# Patient Record
Sex: MALE | Race: WHITE | Employment: FULL TIME | URBAN - METROPOLITAN AREA
[De-identification: names, ages, dates, MRNs, and addresses within clinical notes are randomized per-mention and may not be internally consistent; named-entity substitution may affect disease eponyms.]

---

## 2017-09-22 RX ORDER — LISINOPRIL AND HYDROCHLOROTHIAZIDE 12.5; 1 MG/1; MG/1
TABLET ORAL
Qty: 30 TABLET | Refills: 0 | OUTPATIENT
Start: 2017-09-22 | End: 2017-10-23 | Stop reason: SDUPTHER

## 2017-10-23 RX ORDER — LISINOPRIL AND HYDROCHLOROTHIAZIDE 12.5; 1 MG/1; MG/1
TABLET ORAL
Qty: 15 TABLET | Refills: 0 | OUTPATIENT
Start: 2017-10-23 | End: 2017-11-06 | Stop reason: SDUPTHER

## 2017-11-06 RX ORDER — LISINOPRIL AND HYDROCHLOROTHIAZIDE 12.5; 1 MG/1; MG/1
TABLET ORAL
Qty: 15 TABLET | Refills: 0 | OUTPATIENT
Start: 2017-11-06 | End: 2017-11-07 | Stop reason: SDUPTHER

## 2017-11-07 ENCOUNTER — TELEPHONE (OUTPATIENT)
Dept: INTERNAL MEDICINE CLINIC | Age: 47
End: 2017-11-07

## 2017-11-07 RX ORDER — LISINOPRIL AND HYDROCHLOROTHIAZIDE 12.5; 1 MG/1; MG/1
TABLET ORAL
Qty: 30 TABLET | Refills: 1 | Status: SHIPPED | OUTPATIENT
Start: 2017-11-07 | End: 2017-11-22 | Stop reason: SDUPTHER

## 2017-11-24 RX ORDER — LISINOPRIL AND HYDROCHLOROTHIAZIDE 12.5; 1 MG/1; MG/1
TABLET ORAL
Qty: 30 TABLET | Refills: 0 | Status: SHIPPED | OUTPATIENT
Start: 2017-11-24 | End: 2018-02-19

## 2017-12-21 ENCOUNTER — OFFICE VISIT (OUTPATIENT)
Dept: INTERNAL MEDICINE CLINIC | Age: 47
End: 2017-12-21

## 2017-12-21 VITALS
BODY MASS INDEX: 25.57 KG/M2 | WEIGHT: 188.8 LBS | SYSTOLIC BLOOD PRESSURE: 144 MMHG | RESPIRATION RATE: 16 BRPM | HEIGHT: 72 IN | DIASTOLIC BLOOD PRESSURE: 100 MMHG | HEART RATE: 90 BPM

## 2017-12-21 DIAGNOSIS — I10 ESSENTIAL HYPERTENSION: Primary | ICD-10-CM

## 2017-12-21 DIAGNOSIS — M54.2 NECK PAIN ON LEFT SIDE: ICD-10-CM

## 2017-12-21 DIAGNOSIS — Z72.0 TOBACCO USE: ICD-10-CM

## 2017-12-21 DIAGNOSIS — E78.2 MIXED HYPERLIPIDEMIA: ICD-10-CM

## 2017-12-21 DIAGNOSIS — K21.9 GASTROESOPHAGEAL REFLUX DISEASE, ESOPHAGITIS PRESENCE NOT SPECIFIED: ICD-10-CM

## 2017-12-21 PROCEDURE — 99214 OFFICE O/P EST MOD 30 MIN: CPT | Performed by: INTERNAL MEDICINE

## 2017-12-21 ASSESSMENT — PATIENT HEALTH QUESTIONNAIRE - PHQ9
SUM OF ALL RESPONSES TO PHQ QUESTIONS 1-9: 0
1. LITTLE INTEREST OR PLEASURE IN DOING THINGS: 0
SUM OF ALL RESPONSES TO PHQ9 QUESTIONS 1 & 2: 0
2. FEELING DOWN, DEPRESSED OR HOPELESS: 0

## 2017-12-21 ASSESSMENT — ENCOUNTER SYMPTOMS
SHORTNESS OF BREATH: 0
WHEEZING: 0

## 2017-12-21 NOTE — PROGRESS NOTES
Subjective:      Patient ID: Bhumi Simmons is a 52 y.o. male. HPI  Here for an annual exam.  Brother recently with pancreatic cancer diagnosis. Has a shooting pain in left neck and arm with certain head movements, radiates to his left elbow. HTN-  Denies chest pain or shortness of breath. Denies PND or orthopnea. No lower extremity edema noted. GERD-  well controlled, no GERD when takes meds. Review of Systems   Respiratory: Negative for shortness of breath and wheezing. Cardiovascular: Negative. Negative for chest pain and palpitations. All other systems reviewed and are negative. Current Outpatient Prescriptions   Medication Sig Dispense Refill    lisinopril-hydrochlorothiazide (PRINZIDE;ZESTORETIC) 10-12.5 MG per tablet TAKE 1 TABLET BY MOUTH EVERY DAY 30 tablet 0    omeprazole (PRILOSEC) 20 MG capsule Take 20 mg by mouth daily. No current facility-administered medications for this visit. Allergies:  Review of patient's allergies indicates no known allergies.       Past Medical History:   Diagnosis Date    GERD (gastroesophageal reflux disease)     Hyperlipidemia     Hypertension          Past Surgical History:   Procedure Laterality Date    APPENDECTOMY  age 13    Ruptured    VASECTOMY           Social History   Substance Use Topics    Smoking status: Current Every Day Smoker     Packs/day: 0.50     Start date: 1/1/1985    Smokeless tobacco: Never Used    Alcohol use 10.8 oz/week     18 Cans of beer per week         Family History   Problem Relation Age of Onset    Alzheimer's Disease Mother     Cancer Brother 48     Pancreatic Cancer         Vitals:    12/21/17 1759 12/21/17 1801 12/21/17 1842   BP: (!) 140/70 (!) 140/70 (!) 144/100   Site: Left Arm     Position: Sitting     Cuff Size: Medium Adult     Pulse: 90     Resp: 16     Weight: 188 lb 12.8 oz (85.6 kg)     Height: 6' (1.829 m)          Objective:   Physical Exam   Constitutional: He is oriented to person, place, and time. He appears well-developed and well-nourished. HENT:   Head: Normocephalic and atraumatic. Right Ear: External ear normal.   Left Ear: External ear normal.   Nose: Nose normal.   Mouth/Throat: Oropharynx is clear and moist. No oropharyngeal exudate. Neck: Normal range of motion. Neck supple. No thyromegaly present. Cardiovascular: Normal rate, regular rhythm and normal heart sounds. Pulmonary/Chest: Effort normal and breath sounds normal. He has no wheezes. He has no rales. Abdominal: Soft. Bowel sounds are normal. There is no tenderness. There is no rebound. Musculoskeletal: Normal range of motion. He exhibits no edema. Lymphadenopathy:     He has no cervical adenopathy. Neurological: He is oriented to person, place, and time. Reflex Scores:       Tricep reflexes are 2+ on the right side and 0 on the left side. Bicep reflexes are 2+ on the right side and 0 on the left side. Brachioradialis reflexes are 2+ on the right side and 1+ on the left side. Vitals reviewed. Assessment:      1. Essential hypertension  -borderline high control, but his home pressures were in the 130s over 70s. Will have him repeat his blood pressures at home and call with an update in the next 2-3 weeks   2. Mixed hyperlipidemia  -return for fasting lipids   3. Tobacco use  -discussed his continued tobacco use and increased risk of cardiovascular disease and malignancy. Urged tobacco cessation. He states he has tried multiple avenues for stopping and has been unable to quit    4. Gastroesophageal reflux disease, esophagitis presence not specified  - well-controlled with the daily omeprazole. Send labs. 5. Neck pain on left side  - daily symptoms with reduced reflexes in the left arm. MRI of the cervical spine given that suppressed reflexes in his left arm           Plan:      Follow-up in 3 months to repeat his blood pressure.

## 2018-01-06 ENCOUNTER — HOSPITAL ENCOUNTER (OUTPATIENT)
Dept: MRI IMAGING | Age: 48
Discharge: HOME | End: 2018-01-06
Attending: INTERNAL MEDICINE | Admitting: INTERNAL MEDICINE

## 2018-01-06 DIAGNOSIS — M54.2 CERVICALGIA: ICD-10-CM

## 2018-01-06 DIAGNOSIS — M54.2 NECK PAIN ON LEFT SIDE: ICD-10-CM

## 2018-02-19 RX ORDER — LISINOPRIL AND HYDROCHLOROTHIAZIDE 12.5; 1 MG/1; MG/1
TABLET ORAL
Qty: 90 TABLET | Refills: 0 | Status: SHIPPED | OUTPATIENT
Start: 2018-02-19 | End: 2018-05-22 | Stop reason: SDUPTHER

## 2018-05-22 RX ORDER — LISINOPRIL AND HYDROCHLOROTHIAZIDE 12.5; 1 MG/1; MG/1
TABLET ORAL
Qty: 90 TABLET | Refills: 0 | OUTPATIENT
Start: 2018-05-22 | End: 2018-08-19 | Stop reason: SDUPTHER

## 2019-04-18 ENCOUNTER — OFFICE VISIT (OUTPATIENT)
Dept: ORTHOPEDIC SURGERY | Age: 49
End: 2019-04-18
Payer: COMMERCIAL

## 2019-04-18 VITALS
BODY MASS INDEX: 25.73 KG/M2 | HEART RATE: 78 BPM | WEIGHT: 190 LBS | SYSTOLIC BLOOD PRESSURE: 137 MMHG | HEIGHT: 72 IN | DIASTOLIC BLOOD PRESSURE: 87 MMHG

## 2019-04-18 DIAGNOSIS — M22.41 CHONDROMALACIA PATELLAE OF RIGHT KNEE: ICD-10-CM

## 2019-04-18 DIAGNOSIS — M65.9 SYNOVITIS OF RIGHT KNEE: ICD-10-CM

## 2019-04-18 DIAGNOSIS — M17.11 PRIMARY OSTEOARTHRITIS OF RIGHT KNEE: ICD-10-CM

## 2019-04-18 DIAGNOSIS — M25.561 ACUTE PAIN OF RIGHT KNEE: Primary | ICD-10-CM

## 2019-04-18 PROCEDURE — 99203 OFFICE O/P NEW LOW 30 MIN: CPT | Performed by: FAMILY MEDICINE

## 2019-04-18 PROCEDURE — L3040 FT ARCH SUPRT PREMOLD LONGIT: HCPCS | Performed by: FAMILY MEDICINE

## 2019-04-18 RX ORDER — METHYLPREDNISOLONE 4 MG/1
TABLET ORAL
Qty: 21 KIT | Refills: 0 | Status: SHIPPED | OUTPATIENT
Start: 2019-04-18 | End: 2019-12-26

## 2019-04-18 RX ORDER — NAPROXEN 500 MG/1
500 TABLET ORAL 2 TIMES DAILY WITH MEALS
Qty: 60 TABLET | Refills: 3 | Status: SHIPPED | OUTPATIENT
Start: 2019-04-18 | End: 2019-12-26

## 2019-04-18 NOTE — PROGRESS NOTES
Chief Complaint  Knee Pain (Right knee pain)      Consultation right anterior medial knee pain with mild swelling    History of Present Illness:  Rolanda Alcazar is a 52 y.o. male is a very pleasant white male  for [place] is a very nice patient of Dr. Sindhu Oglesby who is being seen today upon self-referral for evaluation of pain and mild swelling to his right knee. He did have an episode about 10 years ago and which he believes he twisted on his knee and did have 2-3 months of knee pain but he \"walked it off\" and did not seek any type of treatment. Has been effectively asymptomatic up until early April 2019 when he began a insidious onset of pain to the anterior medial portion of his right knee with mild swelling. He does not recall any specific history of injury or new activity prior to becoming symptomatic. He did have an achy pain at 3-4-10 with more sharp pain anteriorly and medially with distance walking pivoting and any attempts at squatting and kneeling. He did ice and took very low doses of over-the-counter ibuprofen roughly 400 mg at night only and did ice and underwent relative rest which has resulted in about a 95% improvement of his symptoms. He was limping around work and his coworkers were noting this which prompted him to seek today's orthopedic consultation. He has not had weightbearing imaging. He is not really complaining of locking or catching but did have some popping and clicking about the knee. He is not complaining of pseudo-buckling or instability symptoms. He is not having pain at night and was actually able to mow the lawn yesterday without tremendous difficulty. He does have a fairly planus foot  but is never utilized orthotics. He is being seen today for orthopedic and sports consultation with weightbearing imaging.       Medical History     Patient's medications, allergies, past medical, surgical, social and family histories were reviewed and updated as appropriate. Review of Systems  Pertinent items are noted in HPI  Review of systems reviewed from Patient History Form dated on 4/18/2019 and available in the patient's chart under the Media tab. Vital Signs  Vitals:    04/18/19 0826   BP: 137/87   Pulse: 78       General Exam:     Constitutional: Patient is adequately groomed with no evidence of malnutrition  DTRs: Deep tendon reflexes are intact  Mental Status: The patient is oriented to time, place and person. The patient's mood and affect are appropriate. Lymphatic: The lymphatic examination bilaterally reveals all areas to be without enlargement or induration. Vascular: Examination reveals no swelling or calf tenderness. Peripheral pulses are palpable and 2+. Neurological: The patient has good coordination. There is no weakness or sensory deficit. Knee Examination  Inspection:  There is no high-grade deformity of that we does have a trace to 1+ knee joint effusion with some mild patellofemoral crepitation. Palpation:  He does have tenderness over the medial and lateral patellofemoral facet as well as centrally over the medial joint line. There is little with lateral tenderness. Rang of Motion:  He is stiff in the terminal 5-10° of extension with flexion to about 125. Hamstrings are fairly tight. Strength:  4+ out of 5 with flexion and extension. Special Tests:  He does have a positive patellar grind test.  Negative apprehension test.  He does have pain with medial Claude's which she rates at 6 out of 10. There is no appreciable click. Negative lateral Claude's. No obvious instability. Screening hip testing is benign. Skin: There are no rashes, ulcerations or lesions. Distal neurovascular exam is intact. Gait: Fluid smooth gait. He has a significant overpronator.     Reflex symmetrically preserved    Additional Comments:     Additional Examinations:  Contralateral Exam: Examination of the left knee reveals Treatment options were discussed with Raimundo Wayne. Review his plain films and exam findings. He does have mild medial and anterior posterior arthritis of the potential for occult nonmechanical medial meniscus tear was discussed. He has had a little way treatment and symmetrically is 95% improved over the last couple of weeks. I'll he has met his family to deductible, he would like to hold off on imaging and try to initial conservative treatment over the next few weeks. We did place him in off-the-shelf power step inserts and instructed him on a proper patellar protection program.  We did place him on a Medrol Dosepak to be followed by Naprosyn 500 mg 1 pill twice daily. Icing and activity modification was discussed. We'll see him back in 3 weeks for repeat evaluation. If he is failing to improve we'll consider imaging and/or formal therapy or possibly even a steroid injection. He will contact us in the interim with questions or concerns. This dictation was performed with a verbal recognition program (DRAGON) and it was checked for errors. It is possible that there are still dictated errors within this office note. If so, please bring any errors to my attention for an addendum. All efforts were made to ensure that this office note is accurate.

## 2019-06-17 RX ORDER — LISINOPRIL AND HYDROCHLOROTHIAZIDE 12.5; 1 MG/1; MG/1
TABLET ORAL
Qty: 90 TABLET | Refills: 0 | Status: SHIPPED | OUTPATIENT
Start: 2019-06-17 | End: 2019-11-10 | Stop reason: SDUPTHER

## 2019-11-25 RX ORDER — LISINOPRIL AND HYDROCHLOROTHIAZIDE 12.5; 1 MG/1; MG/1
TABLET ORAL
Qty: 15 TABLET | Refills: 0 | Status: SHIPPED | OUTPATIENT
Start: 2019-11-25 | End: 2019-12-09 | Stop reason: SDUPTHER

## 2020-02-17 ENCOUNTER — TELEPHONE (OUTPATIENT)
Dept: FAMILY MEDICINE CLINIC | Age: 50
End: 2020-02-17

## 2020-02-18 NOTE — TELEPHONE ENCOUNTER
Received APPROVAL for Vascepa 1GM capsules starting 01/19/2020 through 02/17/2023. Please notify patient. Thank you.

## 2020-09-08 NOTE — PATIENT INSTRUCTIONS
1 month refill. Due for OV to re-assess hypertension and DM.   LUIS M Bean     If you're currently taking an anti-inflammatory such as advil, aleve, ibuprofen, diclofenac, naproxen, meloxicam, celebrex, or nabumetone, please stop. Take Medrol first for 6 days. This is a steroid pack. Flip the package over to the foil side and the directions will tell you to start with 6 pills the first day, 5 pills the second day, etc. Please do not take any other anti-inflammatories with the medrol dose prachi as this can upset your stomach. If something else is needed, you may take extra strength tylenol.      Once you are finished with the medrol, then you may re-start or start your anti-inflammatory: Naproxen 2x/day

## 2021-01-29 DIAGNOSIS — K21.9 GASTROESOPHAGEAL REFLUX DISEASE, UNSPECIFIED WHETHER ESOPHAGITIS PRESENT: ICD-10-CM

## 2021-01-29 DIAGNOSIS — Z12.5 SCREENING PSA (PROSTATE SPECIFIC ANTIGEN): ICD-10-CM

## 2021-01-29 DIAGNOSIS — I10 ESSENTIAL HYPERTENSION: ICD-10-CM

## 2021-01-29 DIAGNOSIS — E78.2 MIXED HYPERLIPIDEMIA: ICD-10-CM

## 2021-01-29 DIAGNOSIS — Z11.59 ENCOUNTER FOR HEPATITIS C SCREENING TEST FOR LOW RISK PATIENT: ICD-10-CM

## 2021-01-29 LAB
A/G RATIO: 1.8 (ref 1.1–2.2)
ALBUMIN SERPL-MCNC: 4.6 G/DL (ref 3.4–5)
ALP BLD-CCNC: 47 U/L (ref 40–129)
ALT SERPL-CCNC: 50 U/L (ref 10–40)
ANION GAP SERPL CALCULATED.3IONS-SCNC: 14 MMOL/L (ref 3–16)
AST SERPL-CCNC: 42 U/L (ref 15–37)
BASOPHILS ABSOLUTE: 0.1 K/UL (ref 0–0.2)
BASOPHILS RELATIVE PERCENT: 0.7 %
BILIRUB SERPL-MCNC: 0.4 MG/DL (ref 0–1)
BUN BLDV-MCNC: 13 MG/DL (ref 7–20)
CALCIUM SERPL-MCNC: 9.2 MG/DL (ref 8.3–10.6)
CHLORIDE BLD-SCNC: 101 MMOL/L (ref 99–110)
CHOLESTEROL, TOTAL: 122 MG/DL (ref 0–199)
CO2: 26 MMOL/L (ref 21–32)
CREAT SERPL-MCNC: 1 MG/DL (ref 0.9–1.3)
EOSINOPHILS ABSOLUTE: 0.2 K/UL (ref 0–0.6)
EOSINOPHILS RELATIVE PERCENT: 3.2 %
GFR AFRICAN AMERICAN: >60
GFR NON-AFRICAN AMERICAN: >60
GLOBULIN: 2.6 G/DL
GLUCOSE BLD-MCNC: 97 MG/DL (ref 70–99)
HCT VFR BLD CALC: 49 % (ref 40.5–52.5)
HDLC SERPL-MCNC: 36 MG/DL (ref 40–60)
HEMOGLOBIN: 16.4 G/DL (ref 13.5–17.5)
HEPATITIS C ANTIBODY INTERPRETATION: NORMAL
LDL CHOLESTEROL CALCULATED: 30 MG/DL
LYMPHOCYTES ABSOLUTE: 1.4 K/UL (ref 1–5.1)
LYMPHOCYTES RELATIVE PERCENT: 21.2 %
MAGNESIUM: 2.1 MG/DL (ref 1.8–2.4)
MCH RBC QN AUTO: 31.1 PG (ref 26–34)
MCHC RBC AUTO-ENTMCNC: 33.5 G/DL (ref 31–36)
MCV RBC AUTO: 92.6 FL (ref 80–100)
MONOCYTES ABSOLUTE: 0.4 K/UL (ref 0–1.3)
MONOCYTES RELATIVE PERCENT: 6.3 %
NEUTROPHILS ABSOLUTE: 4.7 K/UL (ref 1.7–7.7)
NEUTROPHILS RELATIVE PERCENT: 68.6 %
PDW BLD-RTO: 13.4 % (ref 12.4–15.4)
PLATELET # BLD: 195 K/UL (ref 135–450)
PMV BLD AUTO: 11.2 FL (ref 5–10.5)
POTASSIUM SERPL-SCNC: 4 MMOL/L (ref 3.5–5.1)
PROSTATE SPECIFIC ANTIGEN: 1.24 NG/ML (ref 0–4)
RBC # BLD: 5.29 M/UL (ref 4.2–5.9)
SODIUM BLD-SCNC: 141 MMOL/L (ref 136–145)
TOTAL PROTEIN: 7.2 G/DL (ref 6.4–8.2)
TRIGL SERPL-MCNC: 282 MG/DL (ref 0–150)
TSH SERPL DL<=0.05 MIU/L-ACNC: 2.28 UIU/ML (ref 0.27–4.2)
VITAMIN B-12: 458 PG/ML (ref 211–911)
VLDLC SERPL CALC-MCNC: 56 MG/DL
WBC # BLD: 6.8 K/UL (ref 4–11)

## 2023-04-24 ENCOUNTER — TELEPHONE (OUTPATIENT)
Dept: ADMINISTRATIVE | Age: 53
End: 2023-04-24

## 2023-04-24 NOTE — TELEPHONE ENCOUNTER
Submitted PA for Vascepa 1GM capsules. Per plan - NO PA REQUIRED for the Generic ICOSAPENT ETHYL CAPS 1GM. Please notify patient. Thank you.

## 2024-01-29 ENCOUNTER — HOSPITAL ENCOUNTER (OUTPATIENT)
Dept: CT IMAGING | Age: 54
Discharge: HOME | End: 2024-01-29
Attending: INTERNAL MEDICINE
Payer: COMMERCIAL

## 2024-01-29 DIAGNOSIS — Z87.891 PERSONAL HISTORY OF TOBACCO USE: ICD-10-CM

## 2024-01-29 PROCEDURE — 71271 CT THORAX LUNG CANCER SCR C-: CPT

## 2024-02-13 LAB — CRP SERPL-MCNC: <3 MG/L (ref 0–5.1)

## 2024-04-09 ENCOUNTER — TELEPHONE (OUTPATIENT)
Dept: ADMINISTRATIVE | Age: 54
End: 2024-04-09

## 2024-04-09 NOTE — TELEPHONE ENCOUNTER
Submitted PA for Icosapent Ethyl 1GM capsules   Via CM Key: [id number] STATUS: CaseId:[id number];Status:Approved;Review Type:Prior Auth;Coverage Start Date:03/10/2024;Coverage End Date:04/09/2025.    Please notify patient. Thank you.

## 2024-07-18 PROBLEM — I25.10 CORONARY ARTERY CALCIFICATION SEEN ON CT SCAN: Status: ACTIVE | Noted: 2024-07-18

## 2025-03-12 ENCOUNTER — TELEPHONE (OUTPATIENT)
Dept: CASE MANAGEMENT | Age: 55
End: 2025-03-12

## 2025-04-06 ENCOUNTER — TELEPHONE (OUTPATIENT)
Dept: CASE MANAGEMENT | Age: 55
End: 2025-04-06